# Patient Record
Sex: FEMALE | Race: WHITE
[De-identification: names, ages, dates, MRNs, and addresses within clinical notes are randomized per-mention and may not be internally consistent; named-entity substitution may affect disease eponyms.]

---

## 2020-06-13 ENCOUNTER — HOSPITAL ENCOUNTER (EMERGENCY)
Dept: HOSPITAL 41 - JD.ED | Age: 70
Discharge: HOME | End: 2020-06-13
Payer: MEDICARE

## 2020-06-13 VITALS — SYSTOLIC BLOOD PRESSURE: 185 MMHG | HEART RATE: 87 BPM | DIASTOLIC BLOOD PRESSURE: 98 MMHG

## 2020-06-13 DIAGNOSIS — Z88.5: ICD-10-CM

## 2020-06-13 DIAGNOSIS — T16.2XXA: Primary | ICD-10-CM

## 2020-06-13 DIAGNOSIS — Z88.0: ICD-10-CM

## 2020-06-13 NOTE — EDM.PDOC
ED HPI GENERAL MEDICAL PROBLEM





- General


Chief Complaint: ENT Problem


Stated Complaint: Q TIP STUCK IN EAR


Time Seen by Provider: 06/13/20 11:06


Source of Information: Reports: Patient, RN Notes Reviewed


History Limitations: Reports: No Limitations





- History of Present Illness


INITIAL COMMENTS - FREE TEXT/NARRATIVE: 





Patient is a 70-year-old female who presents to the ED for the evaluation of a Q

-tip stuck in her left ear canal.  Patient states that she was simply using a Q-

tip when the cotton part of the Q-tip ended up getting dislodged in her left 

ear canal, she can see this, but she cannot retrieve it or self as she does not 

have the tools at home to do so.  She denies any other sick-like symptoms, 

denies any sort of hearing issues associated with his Q-tip.  This happened 

this morning prior to arrival to the ER.





- Related Data


 Allergies











Allergy/AdvReac Type Severity Reaction Status Date / Time


 


morphine Allergy  Cannot Verified 06/13/20 10:12





   Remember  


 


Penicillins Allergy  Cannot Verified 06/13/20 10:12





   Remember  











Home Meds: 


 Home Meds





. [No Known Home Meds]  07/12/15 [History]











Past Medical History


Cardiovascular History: Reports: Heart Murmur





- Infectious Disease History


Infectious Disease History: Reports: Chicken Pox, Measles, Rheumatic Fever





- Past Surgical History


GI Surgical History: Reports: Appendectomy





Social & Family History





- Family History


Family Medical History: Noncontributory





- Tobacco Use


Smoking Status *Q: Never Smoker





- Caffeine Use


Caffeine Use: Reports: None





- Recreational Drug Use


Recreational Drug Use: No





ED ROS ENT





- Review of Systems


Review Of Systems: Comprehensive ROS is negative, except as noted in HPI.





ED EXAM, ENT





- Physical Exam


Exam: See Below


Exam Limited By: No Limitations


General Appearance: Alert, WD/WN, No Apparent Distress


Ears: Normal External Exam, Hearing Grossly Normal, Normal TMs (Normal TMs were 

visualized after removal of the cotton end of the Q-tip in the left ear canal.)

, Canal Foreign Body (White cotton-like material lodged in the left EAC.)


Respiratory/Chest: No Respiratory Distress, Lungs Clear, Normal Breath Sounds, 

No Accessory Muscle Use, Chest Non-Tender


Cardiovascular: Normal Peripheral Pulses, Regular Rate, Rhythm, No Murmur


Neurological: Alert, Oriented, Normal Cognition


Psychiatric: Normal Affect, Normal Mood


Skin: Warm, Dry, Intact, Normal Color, No Rash





Course





- Vital Signs


Last Recorded V/S: 





 Last Vital Signs











Temp  97.1 F   06/13/20 10:10


 


Pulse  85   06/13/20 10:10


 


Resp  18   06/13/20 10:10


 


BP  189/91 H  06/13/20 10:10


 


Pulse Ox  99   06/13/20 10:10














- Re-Assessments/Exams


Free Text/Narrative Re-Assessment/Exam: 





06/13/20 11:08


Patient presents to the ED for the evaluation of a Q-tip stuck in her left ear 

canal.  This was removed successfully with forceps.  No foreign material was 

left in the ear canal.  TMs were visualized and the ear canal was also 

visualized after removal of the foreign body, and appears to be within normal 

limits.  Patient states she feels much better after removal of this foreign 

body.





Departure





- Departure


Time of Disposition: 11:09


Disposition: Home, Self-Care 01


Condition: Good


Clinical Impression: 


Foreign body in ear


Qualifiers:


 Encounter type: initial encounter Laterality: left Qualified Code(s): T16.2XXA 

- Foreign body in left ear, initial encounter








- Discharge Information


*PRESCRIPTION DRUG MONITORING PROGRAM REVIEWED*: No


*COPY OF PRESCRIPTION DRUG MONITORING REPORT IN PATIENT ARIA: No


Instructions:  Ear Foreign Body, Easy-to-Read


Referrals: 


PCP,None [Primary Care Provider] - 


Additional Instructions: 


You were evaluated in the ER today regarding the Q-tip stuck in your left ear.





This was successfully removed at this ER visit.  There should be no other 

complications associated after the removal.





Please return to the ER at any time if symptoms should change or worsen.





Sepsis Event Note (ED)





- Evaluation


Sepsis Screening Result: No Definite Risk





- Focused Exam


Vital Signs: 





 Vital Signs











  Temp Pulse Resp BP Pulse Ox


 


 06/13/20 10:10  97.1 F  85  18  189/91 H  99